# Patient Record
(demographics unavailable — no encounter records)

---

## 2018-04-17 NOTE — RAD
TWO VIEWS CHEST:

 

Date: 4-17-18 

 

Provided Clinical History: 

Cough. 

 

FINDINGS: 

Comparison 5-14-16. Cardiac and mediastinal silhouette is within normal limits. Lungs appear clear. N
o pleural fluid or pneumothorax apparent. 

 

IMPRESSION: 

No evidence for an acute cardiopulmonary process. 

 

POS: [facility]

## 2019-07-24 NOTE — CT
EXAM:

BRAIN CT WITHOUT IV CONTRAST:

7/24/19

 

HISTORY: 

Injury from a fall with pain.

 

COMPARISON: 

5/2/16.

 

FINDINGS: 

Stable postoperative changes and right cerebellar volume loss in the right posterior fossa. Stable en
cephalomalacia in the frontal lobes, left greater than right. Bilateral atrophy and chronic white mat
ter ischemic change. No focal mass or midline shift. No intra or extra-axial hemorrhage. 

 

IMPRESSION:  

Multiple areas of old encephalomalacia including right posterior fossa, right and left frontal region
s and left posterior frontal all of which appear to be stable and unchanged. No mass or bleed or othe
r acute process. 

 

POS: [facility]

## 2019-07-24 NOTE — CT
CT right hip noncontrast



HISTORY: Right hip injury. Fall.



FINDINGS: Mild joint space narrowing and osteophytosis. No acute fracture, dislocation, or aggressive
 osseous erosions. Degenerative changes sacroiliac joint. Prominent arterial calcification. Fat

protrudes through a anterior abdominal wall midline hernia, partially visualized.







IMPRESSION: Degenerative changes right hip. No acute osseous abnormalities are demonstrated.



Atherosclerosis.



Reported By: JM Andrew 

Electronically Signed:  7/24/2019 4:55 PM

## 2019-07-24 NOTE — RAD
LUMBAR SPINE TWO VIEWS:

7/24/19

 

HISTORY: 

Fall, low back pain.

 

FINDINGS: 

Multilevel degenerative changes are present. No acute fracture or subluxation seen. There are vascula
r calcifications. 

 

IMPRESSION: 

Lumbar spondylosis. 

 

POS: [facility]

## 2019-07-24 NOTE — RAD
EXAM:

RIGHT HIP TWO VIEWS:

7/24/19

 

HISTORY: 

Injury from a fall with right hip and low back pain. 

 

FINDINGS/IMPRESSION: 

Mild degenerative and osteoarthrosis changes. No fracture, dislocation, or other acute process. 

 

POS: [facility]